# Patient Record
Sex: MALE | Race: WHITE
[De-identification: names, ages, dates, MRNs, and addresses within clinical notes are randomized per-mention and may not be internally consistent; named-entity substitution may affect disease eponyms.]

---

## 2018-08-27 ENCOUNTER — HOSPITAL ENCOUNTER (EMERGENCY)
Dept: HOSPITAL 4 - SED | Age: 23
Discharge: HOME | End: 2018-08-27
Payer: MEDICAID

## 2018-08-27 VITALS — BODY MASS INDEX: 32.08 KG/M2 | HEIGHT: 74 IN | WEIGHT: 250 LBS

## 2018-08-27 VITALS — SYSTOLIC BLOOD PRESSURE: 138 MMHG

## 2018-08-27 VITALS — SYSTOLIC BLOOD PRESSURE: 155 MMHG

## 2018-08-27 DIAGNOSIS — R03.0: ICD-10-CM

## 2018-08-27 DIAGNOSIS — J30.9: Primary | ICD-10-CM

## 2018-08-27 DIAGNOSIS — F17.200: ICD-10-CM

## 2018-09-01 ENCOUNTER — HOSPITAL ENCOUNTER (EMERGENCY)
Dept: HOSPITAL 4 - SED | Age: 23
Discharge: HOME | End: 2018-09-01
Payer: MEDICAID

## 2018-09-01 VITALS — BODY MASS INDEX: 29.65 KG/M2 | HEIGHT: 74 IN | WEIGHT: 231 LBS

## 2018-09-01 VITALS — SYSTOLIC BLOOD PRESSURE: 139 MMHG

## 2018-09-01 DIAGNOSIS — R06.02: Primary | ICD-10-CM

## 2018-09-01 DIAGNOSIS — R03.0: ICD-10-CM

## 2018-09-01 DIAGNOSIS — R05: ICD-10-CM

## 2018-09-01 DIAGNOSIS — F17.200: ICD-10-CM

## 2018-09-01 PROCEDURE — 99283 EMERGENCY DEPT VISIT LOW MDM: CPT

## 2018-09-01 PROCEDURE — 71045 X-RAY EXAM CHEST 1 VIEW: CPT

## 2018-09-01 PROCEDURE — 94640 AIRWAY INHALATION TREATMENT: CPT

## 2023-03-01 NOTE — NUR
BHAVANI Luna at bedside for medical evaluation.
Patient AOx4, ambulatory, presents to ER with complaint of allergic reaction x1 
day. Patient states his home was sprayed with RoundUp for bed bugs on the 
carpet. Patient states he has been sleeping on an air mattress but since last 
night has been experiencing nasal congestion and sneezing. No other symptoms or 
complaints at this time.
Patient given written and verbal discharge instructions and verbalizes 
understanding.  ER MD discussed with patient the results and treatment 
provided. Patient in stable condition. ID arm band removed. 

Rx of Medrol dosepak, Albuterol inh, Zyrtec, and Permethrin given. Patient 
educated on pain management and to follow up with PMD. Pain Scale 0/10. 
Opportunity for questions provided and answered. Medication side effect fact 
sheet provided.
Patient to ER bed 5 to gown for evaluation. Side rails up.

Report given to Flaquita HARRINGTON.
20